# Patient Record
Sex: MALE | Race: WHITE | NOT HISPANIC OR LATINO | Employment: OTHER | URBAN - METROPOLITAN AREA
[De-identification: names, ages, dates, MRNs, and addresses within clinical notes are randomized per-mention and may not be internally consistent; named-entity substitution may affect disease eponyms.]

---

## 2018-01-15 NOTE — MISCELLANEOUS
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) (I70 219)   4  Carotid artery stenosis (433 10) (I65 29)   5  Coronary artery disease (414 00) (I25 10)   6  Hyperlipidemia (272 4) (E78 5)   7  Hypertension (401 9) (I10)   8  Iliac Artery Stenosis (440 8)   9  Peripheral arterial disease (443 9) (I73 9)    Current Meds   1  AmLODIPine Besylate 2 5 MG Oral Tablet; Therapy: (Recorded:08Apr2015) to Recorded   2  Aspirin 81 MG TABS; Therapy: (Recorded:15Oct2012) to Recorded   3  Lipitor 40 MG Oral Tablet (Atorvastatin Calcium); Therapy: (Recorded:15Oct2012) to Recorded   4  Quinapril HCl - 40 MG Oral Tablet; Therapy: (Recorded:15Oct2012) to Recorded   5  Terazosin HCl - 5 MG Oral Capsule; Therapy: (Recorded:15Oct2012) to Recorded    Allergies    1  No Known Drug Allergies    Plan  Aortoiliac occlusive disease, Atherosclerosis of lower extremity with claudication    · CTA ABDOMINAL AORTA AND BILATERAL ILIOFEMORAL RUNOFF W WO CONTRAST;  Status:Active;  Requested UDS:[id number];     Signatures   Electronically signed by : Julieta Vicente, ; Nov 2 2016  9:30AM EST                       (Author)

## 2019-05-10 ENCOUNTER — DOCUMENTATION (OUTPATIENT)
Dept: VASCULAR SURGERY | Facility: CLINIC | Age: 84
End: 2019-05-10

## 2019-05-10 DIAGNOSIS — I65.23 CAROTID STENOSIS, BILATERAL: ICD-10-CM

## 2019-05-10 DIAGNOSIS — I71.4 AAA (ABDOMINAL AORTIC ANEURYSM) WITHOUT RUPTURE (HCC): Primary | ICD-10-CM

## 2019-05-10 DIAGNOSIS — I70.219 ATHEROSCLEROSIS OF LOWER EXTREMITY WITH CLAUDICATION (HCC): ICD-10-CM
